# Patient Record
Sex: MALE | Race: WHITE | NOT HISPANIC OR LATINO | URBAN - METROPOLITAN AREA
[De-identification: names, ages, dates, MRNs, and addresses within clinical notes are randomized per-mention and may not be internally consistent; named-entity substitution may affect disease eponyms.]

---

## 2018-04-25 ENCOUNTER — OFFICE (OUTPATIENT)
Dept: URBAN - METROPOLITAN AREA CLINIC 101 | Facility: CLINIC | Age: 68
End: 2018-04-25

## 2018-04-25 VITALS
SYSTOLIC BLOOD PRESSURE: 111 MMHG | HEART RATE: 80 BPM | DIASTOLIC BLOOD PRESSURE: 73 MMHG | HEIGHT: 70 IN | WEIGHT: 259 LBS | TEMPERATURE: 98.1 F

## 2018-04-25 DIAGNOSIS — K70.30 ALCOHOLIC CIRRHOSIS OF LIVER WITHOUT ASCITES: ICD-10-CM

## 2018-04-25 DIAGNOSIS — K74.60 UNSPECIFIED CIRRHOSIS OF LIVER: ICD-10-CM

## 2018-04-25 DIAGNOSIS — Z12.11 ENCOUNTER FOR SCREENING FOR MALIGNANT NEOPLASM OF COLON: ICD-10-CM

## 2018-04-25 LAB
FERRITIN: 21.7 NG/ML — LOW
HEPATIC FUNCTION (LIVER) PANEL: ALANINE AMINOTRANS (ALT/GPT): 44 IU/L
HEPATIC FUNCTION (LIVER) PANEL: ALBUMIN: 3.8 GM/DL
HEPATIC FUNCTION (LIVER) PANEL: ALK PHOS,TOTAL: 114 IU/L
HEPATIC FUNCTION (LIVER) PANEL: AST/SGOT ASPARTATE AMINO TRANS: 48 U/L — HIGH
HEPATIC FUNCTION (LIVER) PANEL: BILIRUBIN,DIRECT: 0.43 MG/DL — HIGH
HEPATIC FUNCTION (LIVER) PANEL: BILIRUBIN,TOTAL: 1.6 MG/DL — HIGH
HEPATIC FUNCTION (LIVER) PANEL: PERFORMING LOCATION: (no result)
HEPATIC FUNCTION (LIVER) PANEL: TOTAL PROTEIN: 7.6 GM/DL
IRON   TIBC: CALC TOTAL IRON BINDING CAP: 532 MCG/DL — HIGH
IRON   TIBC: IRON (FE): 99 MCG/DL
IRON   TIBC: SATURATION %: 18.6 % — LOW
IRON   TIBC: TRANSFERRIN: 380 MG/DL — HIGH

## 2018-04-25 PROCEDURE — 99204 OFFICE O/P NEW MOD 45 MIN: CPT

## 2018-04-25 PROCEDURE — 00031: CPT

## 2018-04-26 LAB
ACTIN (SMOOTH MUSCLE) AB - REF: 11 UNITS
AFP, SERUM, TUMOR MARKER - REF: 4.1 NG/ML
ALPHA-1-ANTITRYPSIN - REF: 130 MG/DL
ANA ANTINUCLEAR AB W/RFLX- REF: ANA ANTINUCLEAR ANTIBODY RES: NEGATIVE
LIVER-KIDNEY MICROSOM AB - REF: 2.5 UNITS
LIVER-KIDNEY MICROSOM AB - REF: PERFORMING LOCATION: (no result)
MITOCHONDRIAL (M2) AB - REF: 4.6 UNITS

## 2018-05-23 ENCOUNTER — ON CAMPUS - OUTPATIENT (OUTPATIENT)
Dept: URBAN - METROPOLITAN AREA HOSPITAL 35 | Facility: HOSPITAL | Age: 68
End: 2018-05-23
Payer: MEDICARE

## 2018-05-23 DIAGNOSIS — Z12.11 ENCOUNTER FOR SCREENING FOR MALIGNANT NEOPLASM OF COLON: ICD-10-CM

## 2018-05-23 DIAGNOSIS — K29.60 OTHER GASTRITIS WITHOUT BLEEDING: ICD-10-CM

## 2018-05-23 DIAGNOSIS — I85.00 ESOPHAGEAL VARICES WITHOUT BLEEDING: ICD-10-CM

## 2018-05-23 DIAGNOSIS — K76.6 PORTAL HYPERTENSION: ICD-10-CM

## 2018-05-23 PROCEDURE — 43239 EGD BIOPSY SINGLE/MULTIPLE: CPT

## 2018-05-23 PROCEDURE — G0121 COLON CA SCRN NOT HI RSK IND: HCPCS

## 2019-04-03 ENCOUNTER — OFFICE (OUTPATIENT)
Dept: URBAN - METROPOLITAN AREA CLINIC 101 | Facility: CLINIC | Age: 69
End: 2019-04-03

## 2019-04-03 VITALS
TEMPERATURE: 98.1 F | SYSTOLIC BLOOD PRESSURE: 7 MMHG | WEIGHT: 270 LBS | DIASTOLIC BLOOD PRESSURE: 90 MMHG | HEIGHT: 70 IN | HEART RATE: 83 BPM | DIASTOLIC BLOOD PRESSURE: 77 MMHG | SYSTOLIC BLOOD PRESSURE: 112 MMHG

## 2019-04-03 DIAGNOSIS — K70.30 ALCOHOLIC CIRRHOSIS OF LIVER WITHOUT ASCITES: ICD-10-CM

## 2019-04-03 DIAGNOSIS — K74.60 UNSPECIFIED CIRRHOSIS OF LIVER: ICD-10-CM

## 2019-04-03 DIAGNOSIS — B96.81 HELICOBACTER PYLORI [H. PYLORI] AS THE CAUSE OF DISEASES CLA: ICD-10-CM

## 2019-04-03 DIAGNOSIS — D64.89 OTHER SPECIFIED ANEMIAS: ICD-10-CM

## 2019-04-03 LAB
CBC WITH DIFFERENTIAL/PLATELET: BASO (ABSOLUTE): 0 X10E3/UL (ref 0–0.2)
CBC WITH DIFFERENTIAL/PLATELET: BASOS: 0 %
CBC WITH DIFFERENTIAL/PLATELET: EOS (ABSOLUTE): 0.2 X10E3/UL (ref 0–0.4)
CBC WITH DIFFERENTIAL/PLATELET: EOS: 4 %
CBC WITH DIFFERENTIAL/PLATELET: HEMATOCRIT: 35.1 % — LOW (ref 37.5–51)
CBC WITH DIFFERENTIAL/PLATELET: HEMATOLOGY COMMENTS: (no result)
CBC WITH DIFFERENTIAL/PLATELET: HEMOGLOBIN: 10.9 G/DL — LOW (ref 13–17.7)
CBC WITH DIFFERENTIAL/PLATELET: IMMATURE CELLS: (no result)
CBC WITH DIFFERENTIAL/PLATELET: IMMATURE GRANS (ABS): 0 X10E3/UL (ref 0–0.1)
CBC WITH DIFFERENTIAL/PLATELET: IMMATURE GRANULOCYTES: 0 %
CBC WITH DIFFERENTIAL/PLATELET: LYMPHS (ABSOLUTE): 1.3 X10E3/UL (ref 0.7–3.1)
CBC WITH DIFFERENTIAL/PLATELET: LYMPHS: 28 %
CBC WITH DIFFERENTIAL/PLATELET: MCH: 25.7 PG — LOW (ref 26.6–33)
CBC WITH DIFFERENTIAL/PLATELET: MCHC: 31.1 G/DL — LOW (ref 31.5–35.7)
CBC WITH DIFFERENTIAL/PLATELET: MCV: 83 FL (ref 79–97)
CBC WITH DIFFERENTIAL/PLATELET: MONOCYTES(ABSOLUTE): 0.4 X10E3/UL (ref 0.1–0.9)
CBC WITH DIFFERENTIAL/PLATELET: MONOCYTES: 9 %
CBC WITH DIFFERENTIAL/PLATELET: NEUTROPHILS (ABSOLUTE): 2.8 X10E3/UL (ref 1.4–7)
CBC WITH DIFFERENTIAL/PLATELET: NEUTROPHILS: 59 %
CBC WITH DIFFERENTIAL/PLATELET: NRBC: (no result)
CBC WITH DIFFERENTIAL/PLATELET: PLATELETS: 96 X10E3/UL — CRITICAL LOW (ref 150–379)
CBC WITH DIFFERENTIAL/PLATELET: RBC: 4.24 X10E6/UL (ref 4.14–5.8)
CBC WITH DIFFERENTIAL/PLATELET: RDW: 20 % — HIGH (ref 12.3–15.4)
CBC WITH DIFFERENTIAL/PLATELET: WBC: 4.7 X10E3/UL (ref 3.4–10.8)
FERRITIN, SERUM: 25 NG/ML — LOW (ref 30–400)
H PYLORI BREATH TEST: NEGATIVE
IRON AND TIBC: IRON BIND.CAP.(TIBC): 444 UG/DL (ref 250–450)
IRON AND TIBC: IRON SATURATION: 21 % (ref 15–55)
IRON AND TIBC: IRON: 94 UG/DL (ref 38–169)
IRON AND TIBC: UIBC: 350 UG/DL — HIGH (ref 111–343)
VITAMIN B12 AND FOLATE: FOLATE (FOLIC ACID), SERUM: 14.3 NG/ML (ref 3–?)
VITAMIN B12 AND FOLATE: VITAMIN B12: 1099 PG/ML (ref 232–1245)

## 2019-04-03 PROCEDURE — 99215 OFFICE O/P EST HI 40 MIN: CPT

## 2019-04-03 NOTE — SERVICEHPINOTES
BUSHRA ALCALA   is a   68   male who presents for anemia. He was found to have cirrhosis incidentally last year upon CT scan. EGD/colonoscopy done on 5/23/18 revealed grade 1 esophageal varices, no gastric varices, portal gastropathy, and bx positive for h pylori (treated with amoxicillin, clarithro, PPI) repeat EGD in 1.5 years for variceal surveillance. Colonoscopy revealed mild diverticulosis but otherwise unremarkable no polyps. Workup for autoimmune liver disease was negative. He has a h/o alcohol use, 1 shot with ginger ale per day for years is drinking less than prior but still having an alcoholic drink daily (liquor or wine with dinner).He has continued to have normocytic anemia. Hgb is around baseline compared to last years labs. He has started oral iron pills and reports dark stool related to this but prior to starting these, no black stool. Denies rectal bleeding, n/v, abdominal pain, dysphagia, or heartburn. He is trying to eat healthier and lose weight. He also started taking a vitamin b12 pill. He has noticed more fatigue and SOB when walking. Reports a lot of issues with arthritis had his shoulder replaced last july and needs other one replaced at some point. 7/2018 hgb 11.8BR3/20-hgb 10.5BR3/22-hgb 10.1BR3/29-hgb 11.5 (MCV 82)BRferritin-18Last year (4/2018) ferritin-21.7, iron sat 18.6%BR

## 2019-05-23 ENCOUNTER — OFFICE (OUTPATIENT)
Dept: URBAN - METROPOLITAN AREA CLINIC 101 | Facility: CLINIC | Age: 69
End: 2019-05-23

## 2019-05-23 VITALS
TEMPERATURE: 98.1 F | SYSTOLIC BLOOD PRESSURE: 133 MMHG | HEART RATE: 87 BPM | HEIGHT: 70 IN | DIASTOLIC BLOOD PRESSURE: 85 MMHG | WEIGHT: 266 LBS

## 2019-05-23 DIAGNOSIS — D50.9 IRON DEFICIENCY ANEMIA, UNSPECIFIED: ICD-10-CM

## 2019-05-23 DIAGNOSIS — K74.60 UNSPECIFIED CIRRHOSIS OF LIVER: ICD-10-CM

## 2019-05-23 DIAGNOSIS — B96.81 HELICOBACTER PYLORI [H. PYLORI] AS THE CAUSE OF DISEASES CLA: ICD-10-CM

## 2019-05-23 PROCEDURE — 99214 OFFICE O/P EST MOD 30 MIN: CPT

## 2021-10-09 ENCOUNTER — INPATIENT HOSPITAL (OUTPATIENT)
Dept: URBAN - METROPOLITAN AREA HOSPITAL 13 | Facility: HOSPITAL | Age: 71
End: 2021-10-09
Payer: COMMERCIAL

## 2021-10-09 DIAGNOSIS — D68.9 COAGULATION DEFECT, UNSPECIFIED: ICD-10-CM

## 2021-10-09 DIAGNOSIS — K92.2 GASTROINTESTINAL HEMORRHAGE, UNSPECIFIED: ICD-10-CM

## 2021-10-09 DIAGNOSIS — D69.6 THROMBOCYTOPENIA, UNSPECIFIED: ICD-10-CM

## 2021-10-09 DIAGNOSIS — D62 ACUTE POSTHEMORRHAGIC ANEMIA: ICD-10-CM

## 2021-10-09 DIAGNOSIS — I95.9 HYPOTENSION, UNSPECIFIED: ICD-10-CM

## 2021-10-09 PROCEDURE — 99223 1ST HOSP IP/OBS HIGH 75: CPT | Performed by: INTERNAL MEDICINE

## 2021-10-10 PROCEDURE — 99232 SBSQ HOSP IP/OBS MODERATE 35: CPT | Performed by: INTERNAL MEDICINE

## 2021-10-11 ENCOUNTER — INPATIENT HOSPITAL (OUTPATIENT)
Dept: URBAN - METROPOLITAN AREA HOSPITAL 13 | Facility: HOSPITAL | Age: 71
End: 2021-10-11
Payer: COMMERCIAL

## 2021-10-11 DIAGNOSIS — K74.60 UNSPECIFIED CIRRHOSIS OF LIVER: ICD-10-CM

## 2021-10-11 DIAGNOSIS — D50.9 IRON DEFICIENCY ANEMIA, UNSPECIFIED: ICD-10-CM

## 2021-10-11 DIAGNOSIS — Z79.01 LONG TERM (CURRENT) USE OF ANTICOAGULANTS: ICD-10-CM

## 2021-10-11 PROCEDURE — 99232 SBSQ HOSP IP/OBS MODERATE 35: CPT | Performed by: NURSE PRACTITIONER

## 2021-10-12 PROCEDURE — 99232 SBSQ HOSP IP/OBS MODERATE 35: CPT | Performed by: NURSE PRACTITIONER

## 2021-10-13 PROCEDURE — 99232 SBSQ HOSP IP/OBS MODERATE 35: CPT | Performed by: NURSE PRACTITIONER

## 2021-10-14 PROCEDURE — 99232 SBSQ HOSP IP/OBS MODERATE 35: CPT | Performed by: NURSE PRACTITIONER

## 2021-10-15 PROCEDURE — 99232 SBSQ HOSP IP/OBS MODERATE 35: CPT | Performed by: NURSE PRACTITIONER

## 2021-10-26 ENCOUNTER — INPATIENT HOSPITAL (OUTPATIENT)
Dept: URBAN - METROPOLITAN AREA HOSPITAL 34 | Facility: HOSPITAL | Age: 71
End: 2021-10-26
Payer: COMMERCIAL

## 2021-10-26 DIAGNOSIS — K70.31 ALCOHOLIC CIRRHOSIS OF LIVER WITH ASCITES: ICD-10-CM

## 2021-10-26 DIAGNOSIS — Z79.01 LONG TERM (CURRENT) USE OF ANTICOAGULANTS: ICD-10-CM

## 2021-10-26 DIAGNOSIS — R60.1 GENERALIZED EDEMA: ICD-10-CM

## 2021-10-26 PROCEDURE — 99222 1ST HOSP IP/OBS MODERATE 55: CPT | Performed by: INTERNAL MEDICINE

## 2021-10-27 ENCOUNTER — INPATIENT HOSPITAL (OUTPATIENT)
Dept: URBAN - METROPOLITAN AREA HOSPITAL 34 | Facility: HOSPITAL | Age: 71
End: 2021-10-27
Payer: COMMERCIAL

## 2021-10-27 DIAGNOSIS — K70.31 ALCOHOLIC CIRRHOSIS OF LIVER WITH ASCITES: ICD-10-CM

## 2021-10-27 DIAGNOSIS — Z79.01 LONG TERM (CURRENT) USE OF ANTICOAGULANTS: ICD-10-CM

## 2021-10-27 DIAGNOSIS — R60.1 GENERALIZED EDEMA: ICD-10-CM

## 2021-10-27 DIAGNOSIS — D64.89 OTHER SPECIFIED ANEMIAS: ICD-10-CM

## 2021-10-27 PROCEDURE — 99232 SBSQ HOSP IP/OBS MODERATE 35: CPT | Performed by: NURSE PRACTITIONER

## 2021-10-28 PROCEDURE — 99232 SBSQ HOSP IP/OBS MODERATE 35: CPT | Performed by: NURSE PRACTITIONER

## 2021-10-29 ENCOUNTER — INPATIENT HOSPITAL (OUTPATIENT)
Dept: URBAN - METROPOLITAN AREA HOSPITAL 34 | Facility: HOSPITAL | Age: 71
End: 2021-10-29
Payer: COMMERCIAL

## 2021-10-29 DIAGNOSIS — K74.60 UNSPECIFIED CIRRHOSIS OF LIVER: ICD-10-CM

## 2021-10-29 DIAGNOSIS — K31.89 OTHER DISEASES OF STOMACH AND DUODENUM: ICD-10-CM

## 2021-10-29 DIAGNOSIS — R18.8 OTHER ASCITES: ICD-10-CM

## 2021-10-29 DIAGNOSIS — D64.89 OTHER SPECIFIED ANEMIAS: ICD-10-CM

## 2021-10-29 PROCEDURE — 43239 EGD BIOPSY SINGLE/MULTIPLE: CPT | Performed by: INTERNAL MEDICINE

## 2021-12-17 ENCOUNTER — OFFICE (OUTPATIENT)
Dept: URBAN - METROPOLITAN AREA CLINIC 102 | Facility: CLINIC | Age: 71
End: 2021-12-17

## 2021-12-17 VITALS
WEIGHT: 255 LBS | HEIGHT: 71 IN | HEART RATE: 60 BPM | DIASTOLIC BLOOD PRESSURE: 76 MMHG | SYSTOLIC BLOOD PRESSURE: 148 MMHG | TEMPERATURE: 97.5 F

## 2021-12-17 DIAGNOSIS — R60.1 GENERALIZED EDEMA: ICD-10-CM

## 2021-12-17 DIAGNOSIS — D50.9 IRON DEFICIENCY ANEMIA, UNSPECIFIED: ICD-10-CM

## 2021-12-17 DIAGNOSIS — K74.60 UNSPECIFIED CIRRHOSIS OF LIVER: ICD-10-CM

## 2021-12-17 DIAGNOSIS — D69.6 THROMBOCYTOPENIA, UNSPECIFIED: ICD-10-CM

## 2021-12-17 PROCEDURE — 99215 OFFICE O/P EST HI 40 MIN: CPT

## 2021-12-17 NOTE — SERVICEHPINOTES
BUSHRA ALCALA is a 72 yo wm who is being seen for f/u [facility] visit. His PMHx include liver cirrhosis, G1 esophageal varices, portal gastropathy, HTN, HLD, diastolic HF, DVT, morbid obesity, gout, R. elbow bursitis, cellulitis left lower limb, leg swelling, thrombocytopenia, severe anemia (8units of PRBC transfusion in H in October 2021), and recent PRPBC transfusion for GI bleeding. He was diagnosed with DVT in Jan 2021 in the left leg. Started on Eliquis 5mg BID. He was admitted in the [facility] ICU with acute GI bleed. Hgb 3. Had Paracentesis 10/14/2021 (taken out 4.5L). During hospital stay, he received IV Heparin and was not ideal candidate for anticoagulation so IVC filter was placed. Had TERESE, may be due to recent regular naproxen and alcohol use. He was clinically stable and discharged home on 10/16/2021 with Hgb 10.5. Notes lost weight 9 lbs over 2 weeks. Drinks 1 glass of wine twice per week. He takes Lactulose 30ml daily. BM regular, goes 1-2 time daily, BSF type 4. He denies dysphagia, hematemesis, no abdominal pain, fever, chills, n/v/c/d/, rectal bleeding, or jaundice. No other complaints.12/15/2021: CT chest abdomen/pelvis: Chest: 1. No acute cardiopulmonary process. 2. Atherosclerosis including severe CAD.
br Abdomen/Pelvis: 3.Cirrhosis with splenomegaly, varices, and moderate abdominal/pelvic ascites. 4. No appreciable gastric wall thickening or mass. 5. Bilateral nephrolithiasis. 6. Choledocholithiasis 7. Degenerative and postop of the spine and shoulders. Bilateral hip DJD
br 11/02/2021: Labs: WBC 2.7, RBC 3.31, Hgb 8.9, Hct 30, MCV 90.3, MCH 26.9, MCHC 29.8, RDW-CV20.8, RDW-SD 67.6,     Plt 76, Ca 7.7, Tot protein 5.5, Albumin 2.6, Bili, Total 1.30, AST 84, ALT 50, Alk P 145br 10/29/2021: EGD at [facility]: Neg intestinal metaplasia, dysplasia, and malignancy. Neg H. Pylori 
br 
10/25/2021: US at [facility]: nonocclusive thrombus in the LC femoral pdaeiv55/15/2021: Occlusive and nonocclusive thrombus in the L. common femoral and femoral veins.

## 2022-02-23 ENCOUNTER — ON CAMPUS - OUTPATIENT (OUTPATIENT)
Dept: URBAN - METROPOLITAN AREA HOSPITAL 16 | Facility: HOSPITAL | Age: 72
End: 2022-02-23
Payer: COMMERCIAL

## 2022-02-23 DIAGNOSIS — D50.9 IRON DEFICIENCY ANEMIA, UNSPECIFIED: ICD-10-CM

## 2022-02-23 DIAGNOSIS — K74.60 UNSPECIFIED CIRRHOSIS OF LIVER: ICD-10-CM

## 2022-02-23 PROCEDURE — 43235 EGD DIAGNOSTIC BRUSH WASH: CPT | Performed by: INTERNAL MEDICINE

## 2022-03-03 ENCOUNTER — OFFICE (OUTPATIENT)
Dept: URBAN - METROPOLITAN AREA CLINIC 34 | Facility: CLINIC | Age: 72
End: 2022-03-03

## 2022-03-03 VITALS
TEMPERATURE: 97.5 F | DIASTOLIC BLOOD PRESSURE: 72 MMHG | HEIGHT: 71 IN | HEART RATE: 63 BPM | SYSTOLIC BLOOD PRESSURE: 128 MMHG | WEIGHT: 249 LBS

## 2022-03-03 DIAGNOSIS — K74.60 UNSPECIFIED CIRRHOSIS OF LIVER: ICD-10-CM

## 2022-03-03 DIAGNOSIS — D50.9 IRON DEFICIENCY ANEMIA, UNSPECIFIED: ICD-10-CM

## 2022-03-03 DIAGNOSIS — R18.8 OTHER ASCITES: ICD-10-CM

## 2022-03-03 DIAGNOSIS — I85.10 SECONDARY ESOPHAGEAL VARICES WITHOUT BLEEDING: ICD-10-CM

## 2022-03-03 PROCEDURE — 99215 OFFICE O/P EST HI 40 MIN: CPT | Performed by: INTERNAL MEDICINE

## 2022-06-08 ENCOUNTER — OFFICE (OUTPATIENT)
Dept: URBAN - METROPOLITAN AREA CLINIC 102 | Facility: CLINIC | Age: 72
End: 2022-06-08

## 2022-06-08 VITALS
TEMPERATURE: 97.6 F | HEART RATE: 58 BPM | DIASTOLIC BLOOD PRESSURE: 87 MMHG | WEIGHT: 237 LBS | HEIGHT: 70 IN | SYSTOLIC BLOOD PRESSURE: 144 MMHG

## 2022-06-08 DIAGNOSIS — K74.60 UNSPECIFIED CIRRHOSIS OF LIVER: ICD-10-CM

## 2022-06-08 DIAGNOSIS — R63.4 ABNORMAL WEIGHT LOSS: ICD-10-CM

## 2022-06-08 DIAGNOSIS — R18.8 OTHER ASCITES: ICD-10-CM

## 2022-06-08 DIAGNOSIS — R68.89 OTHER GENERAL SYMPTOMS AND SIGNS: ICD-10-CM

## 2022-06-08 PROCEDURE — 99214 OFFICE O/P EST MOD 30 MIN: CPT | Performed by: INTERNAL MEDICINE

## 2022-11-10 ENCOUNTER — TELEHEALTH PROVIDED IN PATIENT'S HOME (OUTPATIENT)
Dept: URBAN - METROPOLITAN AREA TELEHEALTH 3 | Facility: TELEHEALTH | Age: 72
End: 2022-11-10
Payer: COMMERCIAL

## 2022-11-10 VITALS — HEIGHT: 70 IN | WEIGHT: 225 LBS

## 2022-11-10 DIAGNOSIS — R19.7 DIARRHEA, UNSPECIFIED: ICD-10-CM

## 2022-11-10 DIAGNOSIS — K74.60 UNSPECIFIED CIRRHOSIS OF LIVER: ICD-10-CM

## 2022-11-10 PROCEDURE — 99215 OFFICE O/P EST HI 40 MIN: CPT | Mod: 95 | Performed by: PHYSICIAN ASSISTANT

## 2022-11-10 NOTE — SERVICEHPINOTES
PATIENT VERIFIED BY DATE OF BIRTH AND NAME. Patient has been consented for this telecommunication visit.
bakari villegas 
73 yo male presents with diarrhea starting a few days ago. He reports loose stools, worse in the morning. Might have 3 BMs in a row. No significant abdominal pain. No blood in stools. No night-time symptoms. He has been feeling ok but is following a bland diet. Does feel somewhat wiped out. Reports labs with PCP yesterday and with hematologist last week (reports iron infusions there). 
bakari villegas He does have lactulose on his med list and reports taking it regularly. bakari
bakari Patient has h/o decompensated cirrhosis and has followed with Dr. Fitzgerald. He had a high AFP (600s) in June with triple phase CT ordered, but it appears we were never able to reach the patient and a letter was sent. This was repeated again in September (another letter sent). The patient reports a CT at [facility] in June, but staff was not able to find this. Patient then reports that his oncologist ordered the CT.  Colonoscopy 5/2018 showed diverticulosis, but otherwise was unremarkable.
bakari villegas ROS as above, otherwise negative. bakari